# Patient Record
Sex: FEMALE | Race: OTHER | Employment: FULL TIME | URBAN - METROPOLITAN AREA
[De-identification: names, ages, dates, MRNs, and addresses within clinical notes are randomized per-mention and may not be internally consistent; named-entity substitution may affect disease eponyms.]

---

## 2017-08-04 ENCOUNTER — HOSPITAL ENCOUNTER (OUTPATIENT)
Age: 37
Discharge: HOME | End: 2017-08-04
Attending: EMERGENCY MEDICINE
Payer: COMMERCIAL

## 2017-08-04 VITALS
DIASTOLIC BLOOD PRESSURE: 83 MMHG | BODY MASS INDEX: 27.31 KG/M2 | WEIGHT: 160 LBS | TEMPERATURE: 98 F | SYSTOLIC BLOOD PRESSURE: 118 MMHG | RESPIRATION RATE: 20 BRPM | HEIGHT: 64 IN | HEART RATE: 76 BPM | OXYGEN SATURATION: 100 %

## 2017-08-04 DIAGNOSIS — M54.2 NECK PAIN: Primary | ICD-10-CM

## 2017-08-04 PROCEDURE — 99203 OFFICE O/P NEW LOW 30 MIN: CPT

## 2017-08-04 RX ORDER — METHYLPREDNISOLONE 4 MG/1
TABLET ORAL
Qty: 1 PACKAGE | Refills: 0 | Status: SHIPPED | OUTPATIENT
Start: 2017-08-04

## 2017-08-04 NOTE — ED NOTES
Patient presents with c/o neck pain that radiates down bilateral upper extremities since approx Feb of this year. 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  Patient has been seen at another hospital back in February and has also

## 2017-08-04 NOTE — ED INITIAL ASSESSMENT (HPI)
Patient presents with neck pain that radiates down both arms. Patient states this has been going on since February. Patient sent over from her chiropractors office.

## 2017-08-04 NOTE — ED PROVIDER NOTES
Patient Seen in: [address]    History   Patient presents with:  Neck Pain (musculoskeletal, neurologic)    Stated Complaint: BILATERAL HAND PAIN    HPI    Patient is a 26-year-old female that has been having intermittent well-developed and well-nourished. HEENT:   Head: Normocephalic and atraumatic.    Right Ear: External ear normal.   Left Ear: External ear normal.   Nose: Nose normal.   Mouth/Throat: Oropharynx is clear and moist.   Eyes: Conjunctivae and EOM are normal on packaging  Qty: 1 Package Refills: 0